# Patient Record
Sex: FEMALE | Race: AMERICAN INDIAN OR ALASKA NATIVE
[De-identification: names, ages, dates, MRNs, and addresses within clinical notes are randomized per-mention and may not be internally consistent; named-entity substitution may affect disease eponyms.]

---

## 2019-10-28 ENCOUNTER — HOSPITAL ENCOUNTER (EMERGENCY)
Dept: HOSPITAL 5 - ED | Age: 54
Discharge: HOME | End: 2019-10-28
Payer: SELF-PAY

## 2019-10-28 VITALS — SYSTOLIC BLOOD PRESSURE: 125 MMHG | DIASTOLIC BLOOD PRESSURE: 71 MMHG

## 2019-10-28 DIAGNOSIS — Z79.899: ICD-10-CM

## 2019-10-28 DIAGNOSIS — J45.901: Primary | ICD-10-CM

## 2019-10-28 LAB
BASOPHILS # (AUTO): 0 K/MM3 (ref 0–0.1)
BASOPHILS NFR BLD AUTO: 0.9 % (ref 0–1.8)
BUN SERPL-MCNC: 8 MG/DL (ref 7–17)
BUN/CREAT SERPL: 13 %
CALCIUM SERPL-MCNC: 9.4 MG/DL (ref 8.4–10.2)
EOSINOPHIL # BLD AUTO: 0.1 K/MM3 (ref 0–0.4)
EOSINOPHIL NFR BLD AUTO: 2.9 % (ref 0–4.3)
HCT VFR BLD CALC: 37.7 % (ref 30.3–42.9)
HEMOLYSIS INDEX: 1
HGB BLD-MCNC: 12.5 GM/DL (ref 10.1–14.3)
LYMPHOCYTES # BLD AUTO: 1.8 K/MM3 (ref 1.2–5.4)
LYMPHOCYTES NFR BLD AUTO: 48.9 % (ref 13.4–35)
MCHC RBC AUTO-ENTMCNC: 33 % (ref 30–34)
MCV RBC AUTO: 84 FL (ref 79–97)
MONOCYTES # (AUTO): 0.4 K/MM3 (ref 0–0.8)
MONOCYTES % (AUTO): 10.2 % (ref 0–7.3)
PLATELET # BLD: 205 K/MM3 (ref 140–440)
RBC # BLD AUTO: 4.52 M/MM3 (ref 3.65–5.03)

## 2019-10-28 PROCEDURE — 36415 COLL VENOUS BLD VENIPUNCTURE: CPT

## 2019-10-28 PROCEDURE — 94644 CONT INHLJ TX 1ST HOUR: CPT

## 2019-10-28 PROCEDURE — 93005 ELECTROCARDIOGRAM TRACING: CPT

## 2019-10-28 PROCEDURE — 99284 EMERGENCY DEPT VISIT MOD MDM: CPT

## 2019-10-28 PROCEDURE — 93010 ELECTROCARDIOGRAM REPORT: CPT

## 2019-10-28 PROCEDURE — 71046 X-RAY EXAM CHEST 2 VIEWS: CPT

## 2019-10-28 PROCEDURE — 85025 COMPLETE CBC W/AUTO DIFF WBC: CPT

## 2019-10-28 PROCEDURE — 84484 ASSAY OF TROPONIN QUANT: CPT

## 2019-10-28 PROCEDURE — 80048 BASIC METABOLIC PNL TOTAL CA: CPT

## 2019-10-28 PROCEDURE — 94640 AIRWAY INHALATION TREATMENT: CPT

## 2019-10-28 NOTE — XRAY REPORT
CHEST 2 VIEWS 



INDICATION: 

Chest Pain.



COMPARISON: 

1/9/2010



FINDINGS:

Support devices: None.



Heart: Within normal limits. 

Lungs/pleura: No acute air space or interstitial disease.  No pneumothorax.



Additional findings: None.



IMPRESSION:

1. No acute findings.



Signer Name: Sony Akers MD 

Signed: 10/28/2019 9:00 AM

 Workstation Name: [id number]

## 2019-10-28 NOTE — EMERGENCY DEPARTMENT REPORT
ED General Adult HPI





- General


Chief complaint: Upper Respiratory Infection


Stated complaint: CHEST PAIN/RT EARACHE


Time Seen by Provider: 10/28/19 09:12


Source: patient, family


Mode of arrival: Ambulatory


Limitations: No Limitations





- History of Present Illness


Initial comments: 





I have a cold with chest tightness cough and earache.  Pain generalized 6 out of

10 and achy .  No medication taken for pain.  Coughing 1 month and has a 

history of asthma and bronchitis.  Take albuterol for asthma as needed


MD Complaint: cold


Onset/Timin


-: month(s)


Radiation: other (generalized pain)


Severity scale (0 -10): 6


Quality: aching


Consistency: intermittent


Worsens with: none


Associated Symptoms: chest pain, cough, other (wheezing and nasal drainage).  

denies: confusion, diaphoresis, fever/chills, headaches, loss of appetite, 

malaise, nausea/vomiting, rash, seizure, shortness of breath, syncope, weakness


Treatments Prior to Arrival: other (albuterol)





- Related Data


                                Home Medications











 Medication  Instructions  Recorded  Confirmed  Last Taken


 


Albuterol Sulfate [Albuterol 0.63%] 0.63 mg IH TID PRN 14 14:00








                                  Previous Rx's











 Medication  Instructions  Recorded  Last Taken  Type


 


Amoxicillin [Trimox CAP] 500 mg PO Q8H #30 capsule 14 Unknown Rx


 


Ibuprofen [Motrin] 800 mg PO TID PRN #15 tablet 14 Unknown Rx


 


Prednisone 20 mg PO QDAY #5 tablet 14 Unknown Rx


 


ALBUTEROL Inhaler (OR & NICU) 2 puff IH Q6H PRN #1 inhalation 10/28/19 Unknown 

Rx





[ProAir HFA Inhaler]    


 


Amoxicillin/K Clav Tab [Augmentin 1 tab PO Q12HR #20 tab 10/28/19 Unknown Rx





875MG TAB]    


 


Cetirizine HCl [ZyrTEC] 10 mg PO QAM 14 Days #14 capsule 10/28/19 Unknown Rx


 


Fluticasone [Flonase] 1 spray NS QDAY 14 Days #1 bottle 10/28/19 Unknown Rx


 


Ibuprofen [Motrin] 800 mg PO Q8HR PRN #12 tablet 10/28/19 Unknown Rx


 


Inhaler, Assist Devices [Space 1 each MC ONCE #1 spacer 10/28/19 Unknown Rx





Chamber Plus]    


 


guaiFENesin/CODEINE [Robitussin AC] 10 ml PO QHS PRN #70 oral.liqd 10/28/19 

Unknown Rx


 


methylPREDNISolone [Medrol 4MG 4 mg PO QAM 6 Days #1 tab.ds.pk 10/28/19 Unknown 

Rx





DOSEPAK (21 tabs)]    











                                    Allergies











Allergy/AdvReac Type Severity Reaction Status Date / Time


 


No Known Allergies Allergy   Unverified 14 06:24














ED Review of Systems


ROS: 


Stated complaint: CHEST PAIN/RT EARACHE


Other details as noted in HPI





Constitutional: denies: chills, fever


Eyes: denies: eye pain, vision change


ENT: congestion.  denies: ear pain, throat pain


Respiratory: cough, wheezing.  denies: shortness of breath, SOB with exertion, 

SOB at rest


Cardiovascular: denies: chest pain, palpitations, edema, syncope


Gastrointestinal: denies: abdominal pain, nausea, vomiting


Musculoskeletal: arthralgia, myalgia


Skin: denies: rash


Neurological: denies: headache, numbness, paresthesias, abnormal gait, vertigo





ED Past Medical Hx





- Past Medical History


Previous Medical History?: Yes


Hx Asthma: Yes (bronchitis)





- Surgical History


Past Surgical History?: No





- Family History


Family history: hypertension





- Social History


Smoking Status: Never Smoker


Substance Use Type: Alcohol





- Medications


Home Medications: 


                                Home Medications











 Medication  Instructions  Recorded  Confirmed  Last Taken  Type


 


Albuterol Sulfate [Albuterol 0.63%] 0.63 mg IH TID PRN 14 14:00 History


 


Amoxicillin [Trimox CAP] 500 mg PO Q8H #30 capsule 14  Unknown Rx


 


Ibuprofen [Motrin] 800 mg PO TID PRN #15 tablet 14  Unknown Rx


 


Prednisone 20 mg PO QDAY #5 tablet 14  Unknown Rx


 


ALBUTEROL Inhaler (OR & NICU) 2 puff IH Q6H PRN #1 inhalation 10/28/19  Unknown 

Rx





[ProAir HFA Inhaler]     


 


Amoxicillin/K Clav Tab [Augmentin 1 tab PO Q12HR #20 tab 10/28/19  Unknown Rx





875MG TAB]     


 


Cetirizine HCl [ZyrTEC] 10 mg PO QAM 14 Days #14 capsule 10/28/19  Unknown Rx


 


Fluticasone [Flonase] 1 spray NS QDAY 14 Days #1 bottle 10/28/19  Unknown Rx


 


Ibuprofen [Motrin] 800 mg PO Q8HR PRN #12 tablet 10/28/19  Unknown Rx


 


Inhaler, Assist Devices [Space 1 each MC ONCE #1 spacer 10/28/19  Unknown Rx





Chamber Plus]     


 


guaiFENesin/CODEINE [Robitussin AC] 10 ml PO QHS PRN #70 oral.liqd 10/28/19  

Unknown Rx


 


methylPREDNISolone [Medrol 4MG 4 mg PO QAM 6 Days #1 tab.ds.pk 10/28/19  Unknown

 Rx





DOSEPAK (21 tabs)]     














ED Physical Exam





- General


Limitations: No Limitations


General appearance: alert, in no apparent distress





- Head


Head exam: Present: atraumatic, normocephalic





- Eye


Eye exam: Present: normal appearance, PERRL, EOMI


Pupils: Present: normal accommodation





- ENT


ENT exam: Present: normal orophraynx, mucous membranes moist, TM's normal 

bilaterally (congested), other (nasal congestion with drainage.  Mildly 

increasing turbinates with erythema).  Absent: normal external ear exam





- Neck


Neck exam: Present: normal inspection, full ROM.  Absent: tenderness, 

lymphadenopathy





- Respiratory


Respiratory exam: Present: wheezes (scattered wheezes into lung fields), other 

(dry cough).  Absent: respiratory distress, chest wall tenderness, accessory 

muscle use





- Cardiovascular


Cardiovascular Exam: Present: normal rhythm, bradycardia, normal heart sounds, 

other (patient reports that she always has a low heart rate and manual pulses at

55 bpm)





- GI/Abdominal


GI/Abdominal exam: Present: soft (.), normal bowel sounds.  Absent: tenderness





- Extremities Exam


Extremities exam: Present: normal inspection, full ROM, normal capillary refill,

other (No cce. + 2 pulses in all extremities, no neurovascular compromise).  

Absent: tenderness, pedal edema, joint swelling, calf tenderness





- Back Exam


Back exam: Present: normal inspection, full ROM, other (ambulates without any 

difficulties).  Absent: tenderness, muscle spasm, rash noted





- Neurological Exam


Neurological exam: Present: alert, oriented X3, normal gait





- Psychiatric


Psychiatric exam: Present: normal affect, normal mood





- Skin


Skin exam: Present: warm, dry, intact, normal color.  Absent: rash





ED Course


                                   Vital Signs











  10/28/19





  08:28


 


Temperature 98.1 F


 


Pulse Rate 50 L


 


Respiratory 20





Rate 


 


Blood Pressure 125/71


 


O2 Sat by Pulse 100





Oximetry 











                                   Vital Signs











  10/28/19 10/28/19 10/28/19





  08:28 09:58 10:14


 


Temperature 98.1 F  


 


Pulse Rate 50 L  58 L


 


Pulse Rate [  54 L 





Anterior   





Bilateral   





Throughout]   


 


Respiratory 20  





Rate   


 


Respiratory  17 





Rate [Anterior   





Bilateral   





Throughout]   


 


Blood Pressure 125/71  


 


O2 Sat by Pulse 100  





Oximetry   














- Reevaluation(s)


Reevaluation #1: 





10/28/19 10:08


Patient given Xopenex 0.63 mg nebulizer and Deltasone 60 mg by mouth.  She 

stable in no acute distress.


Reevaluation #2: 





10/28/19 10:14


Patient is stable and in no acute distress.  Lung sounds are clear after 

Xopenex.





ED Medical Decision Making





- Lab Data


Result diagrams: 


                                 10/28/19 08:42





                                 10/28/19 08:42








                                   Lab Results











  10/28/19 10/28/19 Range/Units





  08:42 08:42 


 


WBC  3.8 L   (4.5-11.0)  K/mm3


 


RBC  4.52   (3.65-5.03)  M/mm3


 


Hgb  12.5   (10.1-14.3)  gm/dl


 


Hct  37.7   (30.3-42.9)  %


 


MCV  84   (79-97)  fl


 


MCH  28   (28-32)  pg


 


MCHC  33   (30-34)  %


 


RDW  14.5   (13.2-15.2)  %


 


Plt Count  205   (140-440)  K/mm3


 


Lymph % (Auto)  48.9 H   (13.4-35.0)  %


 


Mono % (Auto)  10.2 H   (0.0-7.3)  %


 


Eos % (Auto)  2.9   (0.0-4.3)  %


 


Baso % (Auto)  0.9   (0.0-1.8)  %


 


Lymph #  1.8   (1.2-5.4)  K/mm3


 


Mono #  0.4   (0.0-0.8)  K/mm3


 


Eos #  0.1   (0.0-0.4)  K/mm3


 


Baso #  0.0   (0.0-0.1)  K/mm3


 


Seg Neutrophils %  37.1 L   (40.0-70.0)  %


 


Seg Neutrophils #  1.4 L   (1.8-7.7)  K/mm3


 


Sodium   139  (137-145)  mmol/L


 


Potassium   3.9  (3.6-5.0)  mmol/L


 


Chloride   105.9  ()  mmol/L


 


Carbon Dioxide   23  (22-30)  mmol/L


 


Anion Gap   14  mmol/L


 


BUN   8  (7-17)  mg/dL


 


Creatinine   0.6 L  (0.7-1.2)  mg/dL


 


Estimated GFR   > 60  ml/min


 


BUN/Creatinine Ratio   13  %


 


Glucose   108 H  ()  mg/dL


 


Calcium   9.4  (8.4-10.2)  mg/dL


 


Troponin T   < 0.010  (0.00-0.029)  ng/mL














- EKG Data


-: EKG Interpreted by Me (attending physician)


Rate: bradycardia (47 bpm.  Manual pulses at 55 bpm)





- EKG Data


When compared to previous EKG there are: no significant change





- Radiology Data


Radiology results: report reviewed


Chest x-ray dictated by radiologist and report reviewed by myself.  Please see 

details below


Print Report





Referring Physician:CLEOPATRA PINEDAPatient Name:MAGALI LINKPatient 

ID:[id number] of Birth:[id number]:FemaleAccession:[id number] 

Date:[id number] Status:Finalized


Findings





[facility] 


 [address] [place] [address] 





 XRay Report 


 Signed 





 Patient: MAGALI LINK MR#: [id number] 


 [id number] 


 : 1965 Acct:[id number] 





 Age/Sex: 53 / F ADM Date: 10/28/19 





 Loc: ED 


 Attending Dr: 








 Ordering Physician: CLEOPATRA PINEDA MD 


 Date of Service: 10/28/19 


 Procedure(s): XR chest routine 2V 


 Accession Number(s): [id number] 





 cc: CLEOPATRA PINEDA MD 





 Fluoro Time In Minutes: 





 CHEST 2 VIEWS 





 INDICATION: 


 Chest Pain. 





 COMPARISON: 


 2010 





 FINDINGS: 


 Support devices: None. 





 Heart: Within normal limits. 


 Lungs/pleura: No acute air space or interstitial disease. No pneumothorax. 





 Additional findings: None. 





 IMPRESSION: 


 1. No acute findings. 





 Signer Name: Sony Akers MD 


 Signed: 10/28/2019 9:00 AM 


 Workstation Name: [id number] 








 Transcribed By: NORMA 


 Dictated By: Sony Akers MD 


 Electronically Authenticated By: Sony Akers MD 


 Signed Date/Time: 10/28/19 0900 











 DD/DT: 10/28/19 0859 


 TD/TT: 





- Medical Decision Making





53-year-old female here for cough congestion for over a month.  Chest x-ray 

reveals no acute findings per radiologist and reviewed by myself.  Patient was 

given Xopenex and Deltasone emergency room without any adverse reaction.  She 

says she feels better.  I discussed diagnosis, treatment plan and x-ray findings

of her along with her EKG and lab work and she voiced understanding.  Patient 

with bronchitis and asthma, mild.  I discussed treatment plan of albuterol,  

Flonase, Augmentin, Medrol Dosepak, Augmentin and Zyrtec along with Motrin and 

to follow-up with her primary care which she says she does have one in 2-3 days 

and she voiced understanding.





- Differential Diagnosis


PNA, ACS, bronchitis, asthma, upper respiratory with cough and congestion


Critical care attestation.: 


If time is entered above; I have spent that time in minutes in the direct care 

of this critically ill patient, excluding procedure time.








ED Disposition


Clinical Impression: 


 Cough





Asthmatic bronchitis with acute exacerbation


Qualifiers:


 Asthma severity: mild Asthma persistence: intermittent Qualified Code(s): 

J45.21 - Mild intermittent asthma with (acute) exacerbation





Disposition: DC-01 TO HOME OR SELFCARE


Is pt being admited?: No


Does the pt Need Aspirin: No


Condition: Stable


Instructions:  Asthma (ED), Acute Bronchitis (ED)


Additional Instructions: 


Follow-up with the primary care physician in 2-3 days and if he do not have one 

follow-up with [facility]


Take medication as prescribed


Return to the emergency room if he symptoms worsens


Please do not drive or operate heavy machinery while taking and guaifenesin with

codeine cough medicine as this causes drowsiness


Referrals: 


Critical access hospital [Outside] - 3-5 Days


Forms:  Work/School Release Form(ED)

## 2020-10-20 ENCOUNTER — HOSPITAL ENCOUNTER (EMERGENCY)
Dept: HOSPITAL 5 - ED | Age: 55
LOS: 1 days | Discharge: HOME | End: 2020-10-21
Payer: SELF-PAY

## 2020-10-20 VITALS — DIASTOLIC BLOOD PRESSURE: 57 MMHG | SYSTOLIC BLOOD PRESSURE: 141 MMHG

## 2020-10-20 DIAGNOSIS — N39.0: Primary | ICD-10-CM

## 2020-10-20 LAB
ALBUMIN SERPL-MCNC: 4.3 G/DL (ref 3.9–5)
ALT SERPL-CCNC: 14 UNITS/L (ref 7–56)
BACTERIA #/AREA URNS HPF: (no result) /HPF
BASOPHILS # (AUTO): 0.1 K/MM3 (ref 0–0.1)
BASOPHILS NFR BLD AUTO: 0.9 % (ref 0–1.8)
BILIRUB UR QL STRIP: (no result)
BLOOD UR QL VISUAL: (no result)
BUN SERPL-MCNC: 13 MG/DL (ref 7–17)
BUN/CREAT SERPL: 19 %
CALCIUM SERPL-MCNC: 10.2 MG/DL (ref 8.4–10.2)
EOSINOPHIL # BLD AUTO: 0.1 K/MM3 (ref 0–0.4)
EOSINOPHIL NFR BLD AUTO: 0.7 % (ref 0–4.3)
HCT VFR BLD CALC: 38.5 % (ref 30.3–42.9)
HEMOLYSIS INDEX: 17
HGB BLD-MCNC: 12.9 GM/DL (ref 10.1–14.3)
LYMPHOCYTES # BLD AUTO: 2.2 K/MM3 (ref 1.2–5.4)
LYMPHOCYTES NFR BLD AUTO: 26.8 % (ref 13.4–35)
MCHC RBC AUTO-ENTMCNC: 34 % (ref 30–34)
MCV RBC AUTO: 82 FL (ref 79–97)
MONOCYTES # (AUTO): 0.8 K/MM3 (ref 0–0.8)
MONOCYTES % (AUTO): 9.5 % (ref 0–7.3)
PH UR STRIP: 6 [PH] (ref 5–7)
PLATELET # BLD: 195 K/MM3 (ref 140–440)
RBC # BLD AUTO: 4.7 M/MM3 (ref 3.65–5.03)
RBC #/AREA URNS HPF: 99 /HPF (ref 0–6)
UROBILINOGEN UR-MCNC: < 2 MG/DL (ref ?–2)
WBC #/AREA URNS HPF: > 182 /HPF (ref 0–6)

## 2020-10-20 PROCEDURE — 81001 URINALYSIS AUTO W/SCOPE: CPT

## 2020-10-20 PROCEDURE — 84484 ASSAY OF TROPONIN QUANT: CPT

## 2020-10-20 PROCEDURE — 36415 COLL VENOUS BLD VENIPUNCTURE: CPT

## 2020-10-20 PROCEDURE — 71046 X-RAY EXAM CHEST 2 VIEWS: CPT

## 2020-10-20 PROCEDURE — 80053 COMPREHEN METABOLIC PANEL: CPT

## 2020-10-20 PROCEDURE — 85025 COMPLETE CBC W/AUTO DIFF WBC: CPT

## 2020-10-20 PROCEDURE — 93005 ELECTROCARDIOGRAM TRACING: CPT

## 2020-10-20 NOTE — XRAY REPORT
CHEST 2 VIEWS 



INDICATION / CLINICAL INFORMATION:

Chest Pain.



COMPARISON: 

10/20/2019



FINDINGS:



SUPPORT DEVICES: None.

HEART / MEDIASTINUM: No significant abnormality. 

LUNGS / PLEURA: No significant pulmonary or pleural abnormality. No pneumothorax. 



ADDITIONAL FINDINGS: No significant additional findings.



IMPRESSION:

No significant abnormality or interval change from 10/28/2015



Signer Name: Gary Noriega MD FACR 

Signed: 10/20/2020 5:57 PM

Workstation Name: [id number]

## 2020-10-20 NOTE — EMERGENCY DEPARTMENT REPORT
ED Chest Pain HPI





- General


Chief Complaint: Chest Pain


Stated Complaint: PAIN IN BLADDER, CHEST PAIN


Time Seen by Provider: 10/20/20 17:27


Source: patient


Mode of arrival: Ambulatory


Limitations: No Limitations





- History of Present Illness


Initial Comments: 


Patient is a 54-year-old -American female with history of bronchitis who 

presents for chest and low back pain x today, pt denies fever or chills, n/v ,or

fever or chills. Pt denies sob, dizziness, no lightheadedness, pt endorses 

dysuria and frequency, she denies hematuria, no hx or renal stones, no hx 

Gallstones,no hx or PE.  Symptoms are rated at 4/10 sharp achy, symptom 

exacerbated by movement, symptom relieved by nothing tried. Pt denies smoking. 


MD Complaint: chest pain





- Related Data


                                Home Medications











 Medication  Instructions  Recorded  Confirmed  Last Taken


 


Albuterol Sulfate [Albuterol 0.63%] 0.63 mg IH TID PRN 01/19/14 01/19/14 01/18/14 14:00








                                  Previous Rx's











 Medication  Instructions  Recorded  Last Taken  Type


 


Amoxicillin [Trimox CAP] 500 mg PO Q8H #30 capsule 01/19/14 Unknown Rx


 


Ibuprofen [Motrin] 800 mg PO TID PRN #15 tablet 01/19/14 Unknown Rx


 


Prednisone 20 mg PO QDAY #5 tablet 01/19/14 Unknown Rx


 


Albuterol Mdi (or & Nicu Only) 2 puff IH Q6H PRN #1 inhalation 10/28/19 Unknown 

Rx





[ProAir HFA Inhaler]    


 


Amoxicillin/K Clav Tab [Augmentin 1 tab PO Q12HR #20 tab 10/28/19 Unknown Rx





875MG TAB]    


 


Cetirizine HCl [ZyrTEC] 10 mg PO QAM 14 Days #14 capsule 10/28/19 Unknown Rx


 


Fluticasone [Flonase] 1 spray NS QDAY 14 Days #1 bottle 10/28/19 Unknown Rx


 


Ibuprofen [Motrin] 800 mg PO Q8HR PRN #12 tablet 10/28/19 Unknown Rx


 


Inhaler, Assist Devices [Space 1 each MC ONCE #1 spacer 10/28/19 Unknown Rx





Chamber Plus]    


 


guaiFENesin/CODEINE [Robitussin AC] 10 ml PO QHS PRN #70 oral.liqd 10/28/19 

Unknown Rx


 


methylPREDNISolone [Medrol 4MG 4 mg PO QAM 6 Days #1 tab.ds.pk 10/28/19 Unknown 

Rx





DOSEPAK (21 tabs)]    


 


Ibuprofen [Motrin 800 MG tab] 800 mg PO Q8HR PRN 7 Days #21 10/21/20 Unknown Rx





 tablet   


 


Sulfamethoxazole/Trimethoprim 1 each PO BID 7 Days #14 tablet 10/21/20 Unknown 

Rx





[Bactrim DS TAB]    











                                    Allergies











Allergy/AdvReac Type Severity Reaction Status Date / Time


 


No Known Allergies Allergy   Unverified 01/19/14 06:24














Heart Score





- HEART Score


History: Slightly suspicious


EKG: Normal


Age: 45-65


Risk factors: No known risk factors


Troponin: < normal limit


HEART Score: 1





ED Review of Systems


ROS: 


Stated complaint: PAIN IN BLADDER, CHEST PAIN


Other details as noted in HPI





Constitutional: denies: chills, fever


Eyes: denies: eye pain, eye discharge, vision change


ENT: denies: ear pain, throat pain


Respiratory: denies: cough, shortness of breath, wheezing


Cardiovascular: denies: chest pain, palpitations


Endocrine: no symptoms reported


Gastrointestinal: denies: abdominal pain, nausea, vomiting, diarrhea


Genitourinary: urgency, dysuria, frequency.  denies: hematuria, discharge, 

dyspareunia


Musculoskeletal: back pain


Skin: denies: rash, lesions


Neurological: denies: headache, weakness, paresthesias, vertigo


Psychiatric: denies: anxiety, depression


Hematological/Lymphatic: denies: easy bleeding, easy bruising





ED Past Medical Hx





- Past Medical History


Hx Asthma: Yes (bronchitis)





- Surgical History


Past Surgical History?: No





- Social History


Smoking Status: Never Smoker





- Medications


Home Medications: 


                                Home Medications











 Medication  Instructions  Recorded  Confirmed  Last Taken  Type


 


Albuterol Sulfate [Albuterol 0.63%] 0.63 mg IH TID PRN 01/19/14 01/19/14 01/18/14 14:00 History


 


Amoxicillin [Trimox CAP] 500 mg PO Q8H #30 capsule 01/19/14  Unknown Rx


 


Ibuprofen [Motrin] 800 mg PO TID PRN #15 tablet 01/19/14  Unknown Rx


 


Prednisone 20 mg PO QDAY #5 tablet 01/19/14  Unknown Rx


 


Albuterol Mdi (or & Nicu Only) 2 puff IH Q6H PRN #1 inhalation 10/28/19  Unknown

 Rx





[ProAir HFA Inhaler]     


 


Amoxicillin/K Clav Tab [Augmentin 1 tab PO Q12HR #20 tab 10/28/19  Unknown Rx





875MG TAB]     


 


Cetirizine HCl [ZyrTEC] 10 mg PO QAM 14 Days #14 capsule 10/28/19  Unknown Rx


 


Fluticasone [Flonase] 1 spray NS QDAY 14 Days #1 bottle 10/28/19  Unknown Rx


 


Ibuprofen [Motrin] 800 mg PO Q8HR PRN #12 tablet 10/28/19  Unknown Rx


 


Inhaler, Assist Devices [Space 1 each MC ONCE #1 spacer 10/28/19  Unknown Rx





Chamber Plus]     


 


guaiFENesin/CODEINE [Robitussin AC] 10 ml PO QHS PRN #70 oral.liqd 10/28/19  

Unknown Rx


 


methylPREDNISolone [Medrol 4MG 4 mg PO QAM 6 Days #1 tab.ds.pk 10/28/19  Unknown

 Rx





DOSEPAK (21 tabs)]     


 


Ibuprofen [Motrin 800 MG tab] 800 mg PO Q8HR PRN 7 Days #21 10/21/20  Unknown Rx





 tablet    


 


Sulfamethoxazole/Trimethoprim 1 each PO BID 7 Days #14 tablet 10/21/20  Unknown 

Rx





[Bactrim DS TAB]     














ED Physical Exam





- General


Limitations: No Limitations


General appearance: alert, in no apparent distress





- Head


Head exam: Present: atraumatic, normocephalic





- Eye


Eye exam: Present: normal appearance, PERRL, EOMI


Pupils: Present: normal accommodation





- ENT


ENT exam: Present: mucous membranes moist





- Neck


Neck exam: Present: normal inspection, full ROM.  Absent: tenderness





- Respiratory


Respiratory exam: Present: normal lung sounds bilaterally.  Absent: respiratory 

distress, wheezes, stridor, chest wall tenderness





- Cardiovascular


Cardiovascular Exam: Present: regular rate, normal rhythm, normal heart sounds. 

Absent: systolic murmur, diastolic murmur, rubs, gallop





- GI/Abdominal


GI/Abdominal exam: Present: soft, normal bowel sounds.  Absent: distended, 

tenderness (right flank ), guarding, rebound, rigid, bruit, hernia





- Rectal


Rectal exam: Present: deferred





- Extremities Exam


Extremities exam: Present: normal inspection, normal capillary refill.  Absent: 

tenderness, pedal edema





- Back Exam


Back exam: Present: normal inspection, full ROM, CVA tenderness (R).  Absent: 

CVA tenderness (L), rash noted





- Neurological Exam


Neurological exam: Present: alert, oriented X3, normal gait





- Psychiatric


Psychiatric exam: Present: normal affect, normal mood





- Skin


Skin exam: Present: warm, dry, intact, normal color.  Absent: rash





ED Course


                                   Vital Signs











  10/20/20 10/20/20 10/20/20





  17:29 22:04 22:18


 


Temperature 98.5 F 98.9 F 98.2 F


 


Pulse Rate 73 48 L 48 L


 


Respiratory 18 16 16





Rate   


 


Blood Pressure 137/80  


 


Blood Pressure  141/57 141/57





[Right]   


 


O2 Sat by Pulse 99 100 100





Oximetry   














MERVIN score





- Mevrin Score


Age > 65: (0) No


Aspirin use within the Past 7 Days: (0) No


3 or more CAD Risk Factors: (0) No


2 or more Angina events in past 24 hrs: (0) No


Known CAD with more than 50% Stenosis: (0) No


Elevated Cardiac Markers: (0) No


ST Deviation Greater than 0.5mm: (0) No


MERVIN Score: 0





ED Medical Decision Making





- Lab Data


Result diagrams: 


                                 10/20/20 18:25





                                 10/20/20 18:25





Labs











  10/20/20 10/20/20 10/20/20





  18:25 18:25 18:25


 


WBC  8.4  


 


RBC  4.70  


 


Hgb  12.9  


 


Hct  38.5  


 


MCV  82  


 


MCH  28  


 


MCHC  34  


 


RDW  13.9  


 


Plt Count  195  


 


Lymph % (Auto)  26.8  


 


Mono % (Auto)  9.5 H  


 


Eos % (Auto)  0.7  


 


Baso % (Auto)  0.9  


 


Lymph # (Auto)  2.2  


 


Mono # (Auto)  0.8  


 


Eos # (Auto)  0.1  


 


Baso # (Auto)  0.1  


 


Seg Neutrophils %  62.1  


 


Seg Neutrophils #  5.2  


 


Sodium    140


 


Potassium    4.1


 


Chloride    103.8


 


Carbon Dioxide    26


 


Anion Gap    14


 


BUN    13


 


Creatinine    0.7


 


Estimated GFR    > 60


 


BUN/Creatinine Ratio    19


 


Glucose    106 H


 


Calcium    10.2


 


Total Bilirubin    0.40


 


AST    26


 


ALT    14


 


Alkaline Phosphatase    70


 


Troponin T   < 0.010 


 


Total Protein    7.8


 


Albumin    4.3


 


Albumin/Globulin Ratio    1.2














  10/20/20





  21:22


 


WBC 


 


RBC 


 


Hgb 


 


Hct 


 


MCV 


 


MCH 


 


MCHC 


 


RDW 


 


Plt Count 


 


Lymph % (Auto) 


 


Mono % (Auto) 


 


Eos % (Auto) 


 


Baso % (Auto) 


 


Lymph # (Auto) 


 


Mono # (Auto) 


 


Eos # (Auto) 


 


Baso # (Auto) 


 


Seg Neutrophils % 


 


Seg Neutrophils # 


 


Sodium 


 


Potassium 


 


Chloride 


 


Carbon Dioxide 


 


Anion Gap 


 


BUN 


 


Creatinine 


 


Estimated GFR 


 


BUN/Creatinine Ratio 


 


Glucose 


 


Calcium 


 


Total Bilirubin 


 


AST 


 


ALT 


 


Alkaline Phosphatase 


 


Troponin T  < 0.010


 


Total Protein 


 


Albumin 


 


Albumin/Globulin Ratio 














                                   Lab Results











  10/20/20 10/20/20 10/20/20 Range/Units





  18:25 18:25 18:25 


 


WBC  8.4    (4.5-11.0)  K/mm3


 


RBC  4.70    (3.65-5.03)  M/mm3


 


Hgb  12.9    (10.1-14.3)  gm/dl


 


Hct  38.5    (30.3-42.9)  %


 


MCV  82    (79-97)  fl


 


MCH  28    (28-32)  pg


 


MCHC  34    (30-34)  %


 


RDW  13.9    (13.2-15.2)  %


 


Plt Count  195    (140-440)  K/mm3


 


Lymph % (Auto)  26.8    (13.4-35.0)  %


 


Mono % (Auto)  9.5 H    (0.0-7.3)  %


 


Eos % (Auto)  0.7    (0.0-4.3)  %


 


Baso % (Auto)  0.9    (0.0-1.8)  %


 


Lymph # (Auto)  2.2    (1.2-5.4)  K/mm3


 


Mono # (Auto)  0.8    (0.0-0.8)  K/mm3


 


Eos # (Auto)  0.1    (0.0-0.4)  K/mm3


 


Baso # (Auto)  0.1    (0.0-0.1)  K/mm3


 


Seg Neutrophils %  62.1    (40.0-70.0)  %


 


Seg Neutrophils #  5.2    (1.8-7.7)  K/mm3


 


Sodium    140  (137-145)  mmol/L


 


Potassium    4.1  (3.6-5.0)  mmol/L


 


Chloride    103.8  ()  mmol/L


 


Carbon Dioxide    26  (22-30)  mmol/L


 


Anion Gap    14  mmol/L


 


BUN    13  (7-17)  mg/dL


 


Creatinine    0.7  (0.6-1.2)  mg/dL


 


Estimated GFR    > 60  ml/min


 


BUN/Creatinine Ratio    19  %


 


Glucose    106 H  ()  mg/dL


 


Calcium    10.2  (8.4-10.2)  mg/dL


 


Total Bilirubin    0.40  (0.1-1.2)  mg/dL


 


AST    26  (5-40)  units/L


 


ALT    14  (7-56)  units/L


 


Alkaline Phosphatase    70  ()  units/L


 


Troponin T   < 0.010   (0.00-0.029)  ng/mL


 


Total Protein    7.8  (6.3-8.2)  g/dL


 


Albumin    4.3  (3.9-5)  g/dL


 


Albumin/Globulin Ratio    1.2  %


 


Urine Color     (Yellow)  


 


Urine Turbidity     (Clear)  


 


Urine pH     (5.0-7.0)  


 


Ur Specific Gravity     (1.003-1.030)  


 


Urine Protein     (Negative)  mg/dL


 


Urine Glucose (UA)     (Negative)  mg/dL


 


Urine Ketones     (Negative)  mg/dL


 


Urine Blood     (Negative)  


 


Urine Nitrite     (Negative)  


 


Urine Bilirubin     (Negative)  


 


Urine Urobilinogen     (<2.0)  mg/dL


 


Ur Leukocyte Esterase     (Negative)  


 


Urine WBC (Auto)     (0.0-6.0)  /HPF


 


Urine RBC (Auto)     (0.0-6.0)  /HPF


 


U Epithel Cells (Auto)     (0-13.0)  /HPF


 


Urine Bacteria (Auto)     (Negative)  /HPF














  10/20/20 10/20/20 Range/Units





  21:22 23:20 


 


WBC    (4.5-11.0)  K/mm3


 


RBC    (3.65-5.03)  M/mm3


 


Hgb    (10.1-14.3)  gm/dl


 


Hct    (30.3-42.9)  %


 


MCV    (79-97)  fl


 


MCH    (28-32)  pg


 


MCHC    (30-34)  %


 


RDW    (13.2-15.2)  %


 


Plt Count    (140-440)  K/mm3


 


Lymph % (Auto)    (13.4-35.0)  %


 


Mono % (Auto)    (0.0-7.3)  %


 


Eos % (Auto)    (0.0-4.3)  %


 


Baso % (Auto)    (0.0-1.8)  %


 


Lymph # (Auto)    (1.2-5.4)  K/mm3


 


Mono # (Auto)    (0.0-0.8)  K/mm3


 


Eos # (Auto)    (0.0-0.4)  K/mm3


 


Baso # (Auto)    (0.0-0.1)  K/mm3


 


Seg Neutrophils %    (40.0-70.0)  %


 


Seg Neutrophils #    (1.8-7.7)  K/mm3


 


Sodium    (137-145)  mmol/L


 


Potassium    (3.6-5.0)  mmol/L


 


Chloride    ()  mmol/L


 


Carbon Dioxide    (22-30)  mmol/L


 


Anion Gap    mmol/L


 


BUN    (7-17)  mg/dL


 


Creatinine    (0.6-1.2)  mg/dL


 


Estimated GFR    ml/min


 


BUN/Creatinine Ratio    %


 


Glucose    ()  mg/dL


 


Calcium    (8.4-10.2)  mg/dL


 


Total Bilirubin    (0.1-1.2)  mg/dL


 


AST    (5-40)  units/L


 


ALT    (7-56)  units/L


 


Alkaline Phosphatase    ()  units/L


 


Troponin T  < 0.010   (0.00-0.029)  ng/mL


 


Total Protein    (6.3-8.2)  g/dL


 


Albumin    (3.9-5)  g/dL


 


Albumin/Globulin Ratio    %


 


Urine Color   Yellow  (Yellow)  


 


Urine Turbidity   Cloudy  (Clear)  


 


Urine pH   6.0  (5.0-7.0)  


 


Ur Specific Gravity   1.012  (1.003-1.030)  


 


Urine Protein   30 mg/dl  (Negative)  mg/dL


 


Urine Glucose (UA)   Neg  (Negative)  mg/dL


 


Urine Ketones   Tr  (Negative)  mg/dL


 


Urine Blood   Lg  (Negative)  


 


Urine Nitrite   Neg  (Negative)  


 


Urine Bilirubin   Neg  (Negative)  


 


Urine Urobilinogen   < 2.0  (<2.0)  mg/dL


 


Ur Leukocyte Esterase   Lg  (Negative)  


 


Urine WBC (Auto)   > 182.0 H  (0.0-6.0)  /HPF


 


Urine RBC (Auto)   99.0  (0.0-6.0)  /HPF


 


U Epithel Cells (Auto)   5.0  (0-13.0)  /HPF


 


Urine Bacteria (Auto)   1+  (Negative)  /HPF














- EKG Data


-: EKG Interpreted by Me


EKG shows normal: sinus rhythm


Rate: normal





- EKG Data


Interpretation: normal EKG (NSR no STEMI, ekg interp be ED attending, )





- Radiology Data


Radiology results: report reviewed, image reviewed


Findings


Reporting MD: Gary Noriega Dictation Time: October 20, 2020 16:57 

: Not available Transcription Date:


 


CHEST 2 VIEWS  


 


INDICATION / CLINICAL INFORMATION:  Chest Pain.  


 


COMPARISON:  10/20/2019  


 


FINDINGS:  


 


SUPPORT DEVICES: None.  HEART / MEDIASTINUM: No significant abnormality.  LUNGS 

/ PLEURA: No significant pulmonary or pleural abnormality. No pneumothorax.  


 


ADDITIONAL FINDINGS: No significant additional findings.  


 


IMPRESSION:  No significant abnormality or interval change from 10/28/2015  


 


Signer Name: Gary Noriega MD FACR  Signed: 10/20/2020 4:57 PM  Workstation 

Name: JOSH








- Medical Decision Making


This is a UTI, pt denies hematuria, voiding now improved, pt declines hx or 

renal stones, there is no fever or chills, plan: dc to home with rx pt will 

follow up with PCP in 2-3 mays.  pt will be dc'd to home in stable condition at 

this time. s con


Critical care attestation.: 


If time is entered above; I have spent that time in minutes in the direct care 

of this critically ill patient, excluding procedure time.








ED Disposition


Clinical Impression: 


 Flank pain





UTI (urinary tract infection)


Qualifiers:


 Urinary tract infection type: acute cystitis Hematuria presence: without 

hematuria Qualified Code(s): N30.00 - Acute cystitis without hematuria





Disposition: DC-01 TO HOME OR SELFCARE


Is pt being admited?: No


Does the pt Need Aspirin: No


Condition: Stable


Instructions:  Urinary Tract Infection in Women (ED)


Prescriptions: 


Sulfamethoxazole/Trimethoprim [Bactrim DS TAB] 1 each PO BID 7 Days #14 tablet


Ibuprofen [Motrin 800 MG tab] 800 mg PO Q8HR PRN 7 Days #21 tablet


 PRN Reason: pain


Referrals: 


ADDI PLAZA MD [Staff Physician] - 3-5 Days


Forms:  Work/School Release Form(ED)


Time of Disposition: 01:06